# Patient Record
Sex: MALE | Race: WHITE
[De-identification: names, ages, dates, MRNs, and addresses within clinical notes are randomized per-mention and may not be internally consistent; named-entity substitution may affect disease eponyms.]

---

## 2020-01-11 ENCOUNTER — HOSPITAL ENCOUNTER (INPATIENT)
Dept: HOSPITAL 84 - D.ER | Age: 25
LOS: 3 days | Discharge: HOME | DRG: 602 | End: 2020-01-14
Attending: FAMILY MEDICINE | Admitting: FAMILY MEDICINE
Payer: COMMERCIAL

## 2020-01-11 VITALS — WEIGHT: 155 LBS | BODY MASS INDEX: 22.19 KG/M2 | HEIGHT: 70 IN | BODY MASS INDEX: 22.19 KG/M2

## 2020-01-11 VITALS — DIASTOLIC BLOOD PRESSURE: 55 MMHG | SYSTOLIC BLOOD PRESSURE: 120 MMHG

## 2020-01-11 VITALS — DIASTOLIC BLOOD PRESSURE: 74 MMHG | SYSTOLIC BLOOD PRESSURE: 116 MMHG

## 2020-01-11 DIAGNOSIS — D64.9: ICD-10-CM

## 2020-01-11 DIAGNOSIS — S02.2XXA: ICD-10-CM

## 2020-01-11 DIAGNOSIS — R47.01: ICD-10-CM

## 2020-01-11 DIAGNOSIS — L03.116: Primary | ICD-10-CM

## 2020-01-11 DIAGNOSIS — S12.8XXA: ICD-10-CM

## 2020-01-11 DIAGNOSIS — S36.13XA: ICD-10-CM

## 2020-01-11 DIAGNOSIS — E87.1: ICD-10-CM

## 2020-01-11 DIAGNOSIS — H11.30: ICD-10-CM

## 2020-01-11 DIAGNOSIS — S05.42XA: ICD-10-CM
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
## 2020-01-11 DIAGNOSIS — E87.6: ICD-10-CM

## 2020-01-11 DIAGNOSIS — N17.9: ICD-10-CM

## 2020-01-11 LAB
ALBUMIN SERPL-MCNC: 2.9 G/DL (ref 3.4–5)
ALP SERPL-CCNC: 63 U/L (ref 46–116)
ALT SERPL-CCNC: 31 U/L (ref 10–68)
AMPHETAMINES UR QL SCN: NEGATIVE QUAL
ANION GAP SERPL CALC-SCNC: 7.3 MMOL/L (ref 8–16)
APAP SERPL-MCNC: 0 UG/ML (ref 10–30)
APPEARANCE UR: CLEAR
APTT BLD: 38.2 SECONDS (ref 22.8–39.4)
BARBITURATES UR QL SCN: NEGATIVE QUAL
BENZODIAZ UR QL SCN: NEGATIVE QUAL
BILIRUB SERPL-MCNC: 0.86 MG/DL (ref 0.2–1.3)
BILIRUB SERPL-MCNC: NEGATIVE MG/DL
BUN SERPL-MCNC: 9 MG/DL (ref 7–18)
BZE UR QL SCN: NEGATIVE QUAL
CALCIUM SERPL-MCNC: 8.4 MG/DL (ref 8.5–10.1)
CANNABINOIDS UR QL SCN: NEGATIVE QUAL
CHLORIDE SERPL-SCNC: 97 MMOL/L (ref 98–107)
CK MB SERPL-MCNC: 1.7 U/L (ref 0–3.6)
CK SERPL-CCNC: 608 UL (ref 21–232)
CO2 SERPL-SCNC: 33 MMOL/L (ref 21–32)
COLOR UR: YELLOW
CREAT SERPL-MCNC: 1 MG/DL (ref 0.6–1.3)
ERYTHROCYTE [DISTWIDTH] IN BLOOD BY AUTOMATED COUNT: 12.5 % (ref 11.5–14.5)
ETHANOL SERPL-MCNC: 0 MG/DL (ref 0–10)
GLOBULIN SER-MCNC: 4.3 G/L
GLUCOSE SERPL-MCNC: 133 MG/DL (ref 74–106)
GLUCOSE SERPL-MCNC: NEGATIVE MG/DL
HCT VFR BLD CALC: 31.7 % (ref 42–54)
HGB BLD-MCNC: 11.2 G/DL (ref 13.5–17.5)
INR PPP: 1.19 (ref 0.85–1.17)
KETONES UR STRIP-MCNC: NEGATIVE MG/DL
LYMPHOCYTES NFR BLD AUTO: 14 % (ref 15–50)
MAGNESIUM SERPL-MCNC: 2.2 MG/DL (ref 1.8–2.4)
MCH RBC QN AUTO: 30.6 PG (ref 26–34)
MCHC RBC AUTO-ENTMCNC: 35.3 G/DL (ref 31–37)
MCV RBC: 86.6 FL (ref 80–100)
NEUTROPHILS NFR BLD AUTO: 78 % (ref 40–80)
NITRITE UR-MCNC: NEGATIVE MG/ML
OPIATES UR QL SCN: NEGATIVE QUAL
OSMOLALITY SERPL CALC.SUM OF ELEC: 268 MOSM/KG (ref 275–300)
PCP UR QL SCN: NEGATIVE QUAL
PH UR STRIP: 5 [PH] (ref 5–6)
PLATELET # BLD EST: NORMAL 10*3/UL
PLATELET # BLD: 319 10X3/UL (ref 130–400)
PMV BLD AUTO: 8.8 FL (ref 7.4–10.4)
POTASSIUM SERPL-SCNC: 3.3 MMOL/L (ref 3.5–5.1)
PROT SERPL-MCNC: 7.2 G/DL (ref 6.4–8.2)
PROT UR-MCNC: NEGATIVE MG/DL
PROTHROMBIN TIME: 15.1 SECONDS (ref 11.6–15)
RBC # BLD AUTO: 3.66 10X6/UL (ref 4.2–6.1)
SODIUM SERPL-SCNC: 134 MMOL/L (ref 136–145)
SP GR UR STRIP: 1.01 (ref 1–1.02)
UROBILINOGEN UR-MCNC: 4 MG/DL
WBC # BLD AUTO: 20.6 10X3/UL (ref 4.8–10.8)

## 2020-01-11 NOTE — NUR
According to the suicide assessment screen the patient scores low for suicide
and does not require a 1:1 assessment. A suicide resource flyer was provided
for the patient.

## 2020-01-12 VITALS — DIASTOLIC BLOOD PRESSURE: 60 MMHG | SYSTOLIC BLOOD PRESSURE: 122 MMHG

## 2020-01-12 VITALS — SYSTOLIC BLOOD PRESSURE: 118 MMHG | DIASTOLIC BLOOD PRESSURE: 60 MMHG

## 2020-01-12 VITALS — SYSTOLIC BLOOD PRESSURE: 108 MMHG | DIASTOLIC BLOOD PRESSURE: 51 MMHG

## 2020-01-12 VITALS — SYSTOLIC BLOOD PRESSURE: 110 MMHG | DIASTOLIC BLOOD PRESSURE: 58 MMHG

## 2020-01-12 VITALS — SYSTOLIC BLOOD PRESSURE: 125 MMHG | DIASTOLIC BLOOD PRESSURE: 66 MMHG

## 2020-01-12 LAB
ALBUMIN SERPL-MCNC: 2.2 G/DL (ref 3.4–5)
ALP SERPL-CCNC: 46 U/L (ref 46–116)
ALT SERPL-CCNC: 23 U/L (ref 10–68)
ANION GAP SERPL CALC-SCNC: 5.4 MMOL/L (ref 8–16)
BASOPHILS NFR BLD AUTO: 0.1 % (ref 0–2)
BILIRUB SERPL-MCNC: 0.67 MG/DL (ref 0.2–1.3)
BUN SERPL-MCNC: 5 MG/DL (ref 7–18)
CALCIUM SERPL-MCNC: 7.5 MG/DL (ref 8.5–10.1)
CHLORIDE SERPL-SCNC: 104 MMOL/L (ref 98–107)
CK MB SERPL-MCNC: 0 U/L (ref 0–3.6)
CO2 SERPL-SCNC: 32 MMOL/L (ref 21–32)
CREAT SERPL-MCNC: 0.9 MG/DL (ref 0.6–1.3)
EOSINOPHIL NFR BLD: 0.4 % (ref 0–7)
ERYTHROCYTE [DISTWIDTH] IN BLOOD BY AUTOMATED COUNT: 13 % (ref 11.5–14.5)
GLOBULIN SER-MCNC: 3.8 G/L
GLUCOSE SERPL-MCNC: 100 MG/DL (ref 74–106)
HCT VFR BLD CALC: 28.1 % (ref 42–54)
HGB BLD-MCNC: 9.5 G/DL (ref 13.5–17.5)
IMM GRANULOCYTES NFR BLD: 0.4 % (ref 0–5)
LYMPHOCYTES NFR BLD AUTO: 14.1 % (ref 15–50)
MAGNESIUM SERPL-MCNC: 2.1 MG/DL (ref 1.8–2.4)
MCH RBC QN AUTO: 29.7 PG (ref 26–34)
MCHC RBC AUTO-ENTMCNC: 33.8 G/DL (ref 31–37)
MCV RBC: 87.8 FL (ref 80–100)
MONOCYTES NFR BLD: 8.7 % (ref 2–11)
NEUTROPHILS NFR BLD AUTO: 76.3 % (ref 40–80)
OSMOLALITY SERPL CALC.SUM OF ELEC: 272 MOSM/KG (ref 275–300)
PLATELET # BLD: 324 10X3/UL (ref 130–400)
PMV BLD AUTO: 8.4 FL (ref 7.4–10.4)
POTASSIUM SERPL-SCNC: 3.4 MMOL/L (ref 3.5–5.1)
PROT SERPL-MCNC: 6 G/DL (ref 6.4–8.2)
RBC # BLD AUTO: 3.2 10X6/UL (ref 4.2–6.1)
SODIUM SERPL-SCNC: 138 MMOL/L (ref 136–145)
WBC # BLD AUTO: 13.4 10X3/UL (ref 4.8–10.8)

## 2020-01-12 NOTE — NUR
ALERT AND ORIENTED X4 WITH GENERALIZED ABRAISIONS AND BRUISING NOTED. ERYTHEMA
AND WARMTH NOTED TO LLE WITH PERIPERAL PULSES NOTED AND 1+ EDEMA TO LLE.
ENCOURAGED TO USE CALL LIGHT FOR ASSIST.

## 2020-01-12 NOTE — NUR
PATIENT RESTING IN BED ON PHONE. PATIENT DENIES NEEDS AT THIS TIME. BED IN
LOWEST POSITION AND CALL LIGHT WITHIN REACH. ENCOURAGED THE PATIENT TO CALL IF
HE HAS NEEDS. WILL CONTINUE TO MONITOR.

## 2020-01-13 VITALS — DIASTOLIC BLOOD PRESSURE: 62 MMHG | SYSTOLIC BLOOD PRESSURE: 110 MMHG

## 2020-01-13 VITALS — SYSTOLIC BLOOD PRESSURE: 102 MMHG | DIASTOLIC BLOOD PRESSURE: 49 MMHG

## 2020-01-13 VITALS — DIASTOLIC BLOOD PRESSURE: 62 MMHG | SYSTOLIC BLOOD PRESSURE: 120 MMHG

## 2020-01-13 VITALS — SYSTOLIC BLOOD PRESSURE: 112 MMHG | DIASTOLIC BLOOD PRESSURE: 62 MMHG

## 2020-01-13 VITALS — SYSTOLIC BLOOD PRESSURE: 115 MMHG | DIASTOLIC BLOOD PRESSURE: 73 MMHG

## 2020-01-13 VITALS — SYSTOLIC BLOOD PRESSURE: 106 MMHG | DIASTOLIC BLOOD PRESSURE: 58 MMHG

## 2020-01-13 LAB
ALBUMIN SERPL-MCNC: 2.1 G/DL (ref 3.4–5)
ALP SERPL-CCNC: 50 U/L (ref 46–116)
ALT SERPL-CCNC: 21 U/L (ref 10–68)
ANION GAP SERPL CALC-SCNC: 7.1 MMOL/L (ref 8–16)
BASOPHILS NFR BLD AUTO: 0.3 % (ref 0–2)
BILIRUB SERPL-MCNC: 0.48 MG/DL (ref 0.2–1.3)
BUN SERPL-MCNC: 5 MG/DL (ref 7–18)
CALCIUM SERPL-MCNC: 7.8 MG/DL (ref 8.5–10.1)
CHLORIDE SERPL-SCNC: 110 MMOL/L (ref 98–107)
CO2 SERPL-SCNC: 29.8 MMOL/L (ref 21–32)
CREAT SERPL-MCNC: 0.9 MG/DL (ref 0.6–1.3)
EOSINOPHIL NFR BLD: 1.9 % (ref 0–7)
ERYTHROCYTE [DISTWIDTH] IN BLOOD BY AUTOMATED COUNT: 13.4 % (ref 11.5–14.5)
GLOBULIN SER-MCNC: 4.1 G/L
GLUCOSE SERPL-MCNC: 105 MG/DL (ref 74–106)
HCT VFR BLD CALC: 29.4 % (ref 42–54)
HGB BLD-MCNC: 9.5 G/DL (ref 13.5–17.5)
IMM GRANULOCYTES NFR BLD: 0.7 % (ref 0–5)
LYMPHOCYTES NFR BLD AUTO: 17.1 % (ref 15–50)
MAGNESIUM SERPL-MCNC: 2.2 MG/DL (ref 1.8–2.4)
MCH RBC QN AUTO: 29.1 PG (ref 26–34)
MCHC RBC AUTO-ENTMCNC: 32.3 G/DL (ref 31–37)
MCV RBC: 89.9 FL (ref 80–100)
MONOCYTES NFR BLD: 7.1 % (ref 2–11)
NEUTROPHILS NFR BLD AUTO: 72.9 % (ref 40–80)
OSMOLALITY SERPL CALC.SUM OF ELEC: 281 MOSM/KG (ref 275–300)
PLATELET # BLD: 355 10X3/UL (ref 130–400)
PMV BLD AUTO: 8.4 FL (ref 7.4–10.4)
POTASSIUM SERPL-SCNC: 3.9 MMOL/L (ref 3.5–5.1)
PROT SERPL-MCNC: 6.2 G/DL (ref 6.4–8.2)
RBC # BLD AUTO: 3.27 10X6/UL (ref 4.2–6.1)
SODIUM SERPL-SCNC: 143 MMOL/L (ref 136–145)
WBC # BLD AUTO: 10.3 10X3/UL (ref 4.8–10.8)
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
## 2020-01-13 NOTE — NUR
PATIENT RESTING IN BED WITH EYES CLOSED AND NO S/S OF DISTRESS. BED IN LOWEST
POSITION AND CALL LIGHT WITHIN REACH. WILL CONTINUE TO MONITOR.

## 2020-01-13 NOTE — NUR
PT WAS ABLE TO VERBALIZED THAT HE NEEDED SOMETHING FOR PAIN AND THAT HE WANTS
TO TAKE A SHOWER. INFORMED HIM THAT ONCE HIS ANTIBIOTIC IS DONE INFUSING THAT
I WILL DISCONNECT HIM SO HE CAN GET IN THE SHOWER.

## 2020-01-13 NOTE — NUR
PT RESTING COMFORTABLY IN BED, COVERED WITH BLANKET FROM HEAD TO TOES. ASKED
PT IF HE WANTED TO TAKE A SHOWER OR WASH UP AND PT STATED "NO". THEN ASKED PT
IF HE WANTED THE CNA TO CHANGE HIS BED AND PT ALSO STATED "NO". PT DID NOT
UNCOVER TO TALK TO NURSE JUST ANSWERED WITH NO FOR ALL QUESTIONS. CALL LIGHT
IN REACH, BEDSIDE RAILS X2, NAD NOTED, WILL CONTINUE TO MONITOR.
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
## 2020-01-13 NOTE — MORECARE
CASE MANAGEMENT DISCHARGE SUMMARY
 
 
PATIENT: HUEY KHAN                       UNIT: [id number]
ACCOUNT#: [id number]                       ADM DATE: 20
AGE: 24     : [birth year]  SEX: M            ROOM/BED: D.1210    
AUTHOR: SYBIL ACUNA                             PHYSICIAN:                               
 
REFERRING PHYSICIAN: ORA GRAYSON MD          
DATE OF SERVICE: 20
Discharge Plan
 
 
Patient Name: HUEY KHAN
Facility: [facility]:[place]
Encounter #: [id number]
Medical Record #: [id number]
: 1995
Planned Disposition: Other Type of Facility
Anticipated Discharge Date: 
 
Discharge Date: 
Expected LOS: 
Initial Reviewer: [id number]
Initial Review Date: 2020
Generated: 20   8:51 pm 
  
 
 
 
 
 
 
 
Patient Name: HUEY KHAN
 
Encounter #: [id number]
Page [id number]
 
 
 
 
 
Electronically Signed by SYBIL ACUNA on 20 at 
 
 
 
 
 
 
**All edits/amendments must be made on the electronic document**
 
DICTATION DATE: 20     : MEDINA  20     
RPT#: [id number]                                DC DATE:        
                                               STATUS: ADM IN  
[facility]
[address] [place] [address]
***END OF REPORT***

## 2020-01-14 VITALS — SYSTOLIC BLOOD PRESSURE: 116 MMHG | DIASTOLIC BLOOD PRESSURE: 82 MMHG

## 2020-01-14 VITALS — DIASTOLIC BLOOD PRESSURE: 78 MMHG | SYSTOLIC BLOOD PRESSURE: 126 MMHG

## 2020-01-14 VITALS — SYSTOLIC BLOOD PRESSURE: 112 MMHG | DIASTOLIC BLOOD PRESSURE: 64 MMHG

## 2020-01-14 VITALS — DIASTOLIC BLOOD PRESSURE: 74 MMHG | SYSTOLIC BLOOD PRESSURE: 122 MMHG

## 2020-01-14 LAB
ALBUMIN SERPL-MCNC: 2.2 G/DL (ref 3.4–5)
ALP SERPL-CCNC: 48 U/L (ref 46–116)
ALT SERPL-CCNC: 24 U/L (ref 10–68)
ANION GAP SERPL CALC-SCNC: 9.4 MMOL/L (ref 8–16)
BASOPHILS NFR BLD AUTO: 0.4 % (ref 0–2)
BILIRUB SERPL-MCNC: 0.5 MG/DL (ref 0.2–1.3)
BUN SERPL-MCNC: 5 MG/DL (ref 7–18)
CALCIUM SERPL-MCNC: 7.8 MG/DL (ref 8.5–10.1)
CHLORIDE SERPL-SCNC: 108 MMOL/L (ref 98–107)
CO2 SERPL-SCNC: 29 MMOL/L (ref 21–32)
CREAT SERPL-MCNC: 0.8 MG/DL (ref 0.6–1.3)
EOSINOPHIL NFR BLD: 3 % (ref 0–7)
ERYTHROCYTE [DISTWIDTH] IN BLOOD BY AUTOMATED COUNT: 13.4 % (ref 11.5–14.5)
GLOBULIN SER-MCNC: 3.9 G/L
GLUCOSE SERPL-MCNC: 88 MG/DL (ref 74–106)
HCT VFR BLD CALC: 30 % (ref 42–54)
HGB BLD-MCNC: 10 G/DL (ref 13.5–17.5)
IMM GRANULOCYTES NFR BLD: 0.9 % (ref 0–5)
LYMPHOCYTES NFR BLD AUTO: 25.9 % (ref 15–50)
MAGNESIUM SERPL-MCNC: 2 MG/DL (ref 1.8–2.4)
MCH RBC QN AUTO: 29.9 PG (ref 26–34)
MCHC RBC AUTO-ENTMCNC: 33.3 G/DL (ref 31–37)
MCV RBC: 89.8 FL (ref 80–100)
MONOCYTES NFR BLD: 7.8 % (ref 2–11)
NEUTROPHILS NFR BLD AUTO: 62 % (ref 40–80)
OSMOLALITY SERPL CALC.SUM OF ELEC: 278 MOSM/KG (ref 275–300)
PLATELET # BLD: 395 10X3/UL (ref 130–400)
PMV BLD AUTO: 8.3 FL (ref 7.4–10.4)
POTASSIUM SERPL-SCNC: 4.4 MMOL/L (ref 3.5–5.1)
PROT SERPL-MCNC: 6.1 G/DL (ref 6.4–8.2)
RBC # BLD AUTO: 3.34 10X6/UL (ref 4.2–6.1)
SODIUM SERPL-SCNC: 142 MMOL/L (ref 136–145)
WBC # BLD AUTO: 7.7 10X3/UL (ref 4.8–10.8)

## 2020-01-14 NOTE — NUR
PRESCRIPTIONS CALLED IN TO [facility] [place] SPOKE TO THE PHARMACIST TASHIA.
 
 
PT STATED THAT HE HAS SOMEONE WHO WILL PICK HIM UP ONCE DISCHARGE.
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
## 2020-01-14 NOTE — NUR
PROVIDED VERBAL AND WRITTEN DISCHARGE TEACHING. PT VERBALIZED UNDERSTANDING
REGARDING TEACHING. PT HAD TOLD THIS NURSE THAT HE HAD A RIDE, NOW SAYING THAT
HE DOES NOT HAVE RIDE. CASE MANGER TO TALK TO PT REGARDING TRANSPORTATION.

## 2020-01-14 NOTE — NUR
LT FA IV INFILTRATED. D/C IV WITH CATHETER TIP INTACT. NEW 20G IV STARTED TO
RT WRIST X1 STICK. PT DENIES ANY NEEDS AT THIS TIME. CALL LIGHT IN REACH,
NAD NOTED, WILL CONTINUE TO MONITOR.
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
## 2025-04-06 NOTE — NUR
CALLED PHARMACY AND SPOKE WITH SHERIDAN, INFORMED HER THAT I NEED VANC FOR PT
DUE AT 0800. Pharmacy Admission Medication Reconciliation Note    Patient is a 1y8m Male with a PMH of *** now admitted on 04-05-25 for Asthma with acute exacerbation    . Admission medication reconciliation completed with ***parent and based on chart review     Drug allergies/intolerances: No Known Drug Allergies  eggs (Rash)      Please see below for home medication list: Albuterol Nebs  Budesonide? Nebs only used when sick    Over-the-counter/supplements/herbal medications: Loratidine only when sick, Tylenol as needed for fever  ***name, dose, concentration, route, frequency, indications, time of administration***    Evaluation:  ***The following barriers to adherence are of concern: *** Used [place] phone  [phone]  Medications are managed at home by: *** @firstname, lastname@ and caregiver state *** X missed doses over past *** (week/month) (concerns/discussions) and *** late doses over the past *** (week/month)    Patient preferred pharmacy: ***    Please reach out to pharmacy with any questions or concerns